# Patient Record
Sex: FEMALE | Race: WHITE
[De-identification: names, ages, dates, MRNs, and addresses within clinical notes are randomized per-mention and may not be internally consistent; named-entity substitution may affect disease eponyms.]

---

## 2017-12-15 NOTE — PCVCIMAG
--------------- APPROVED REPORT --------------





Study performed:  12/15/2017 12:40:20



EXAM: Comprehensive 2D, Doppler, and color-flow 

Echocardiogram

Patient Location: Echo lab

Room #:  2Status:  routine



BSA:         1.78

HR: 67 bpmBP:          106/72 mmHg

Rhythm: NSR



Other Information 

Study Quality: Adequate



Risk Factors: 

Cardiac Risk Factors:  HTN, Hyperlipidemia



Indications

Atrial Fibrillation

CAD

Hypertension/HDD

Coronary Ca+ score &gt;1000,  paroxysmal a fib



2D Dimensions

LVEF(%):  65.79 (&gt;50%)

IVSd:  9.64 (7-11mm)LVOT Diam:  18.54 (18-24mm) 

LVDd:  45.71 mm

PWd:  8.81 (7-11mm)Ascending Ao:  31.33 (22-36mm)

LVDs:  29.22 (25-40mm)

Left Atrium:  33.53 (27-40mm)

Aortic Root:  24.20 mm

LV Single Plane 4CH:  48.60 %

LV Single Plane 2CH:  53.32 %Morgan's LVEF:  50.96 %

Biplane EF:  51.3 %



Volumes

Left Atrial Volume (Systole)

Single Plane 4CH:  63.56 mLSingle Plane 2CH:  46.62 mL

Biplane LA Volume:  57.00 mLLA ESV Index:  32.00 mL/m2



Aortic Valve

AoV Peak Alexi.:  1.36 m/s

AO Peak Gr.:  7.45 mmHgLVOT Max P.51 mmHg

LVOT Max V:  0.61 m/s

SHELBIE Vmax: 1.21 cm2

AI Vmax:  2.53 m/s

AI [place]:  1.22 m/s2

AI PHT:  599.88 ms



Mitral Valve

E/A Ratio:  0.5

MV Decel. Time:  312.71 ms

MV E Max Alexi.:  0.30 m/s

MV A Alexi.:  0.61 m/s

IVRT:  141.87 ms



TDI

E/Lateral E':  10.00E/Medial E':  7.50

Medial E' Alexi.:  0.04 m/s

Lateral E' Alexi.:  0.03 m/s



Pulmonary Valve

PV Peak Alexi.:  0.77 m/sPV Peak Gr.:  2.36 mmHg



Pulmonary Vein

P Vein S:    0.42 m/sP Vein A:  0.29 m/s

P Vein D:   0.22 m/sP Vein A Dur.:  96.9 msec

P Vein S/D Ratio:  1.91



Tricuspid Valve

TR Peak Alexi.:  2.67 m/s

TR Peak Gr.:  28.50 mmHg

TV Vmax:  0.46 m/sPA Pressure:  36.00 mmHg



Left Ventricle

The left ventricle is normal size. There is normal LV segmental wall 

motion. There is normal left ventricular wall thickness. Left 

ventricular systolic function is low normal. LVEF is 50-55%. Grade I 

- abnormal relaxation pattern.



Right Ventricle

The right ventricle is normal size. The right ventricular systolic 

function is normal.



Atria

The left atrium size is normal. The right atrium size is 

normal.



Aortic Valve

Aortic valve is trileaflet. The aortic valve leaflets are mildly 

sclerotic. Trace to mild aortic regurgitation. There is no aortic 

valvular stenosis.



Mitral Valve

The mitral valve is normal in structure. Trace mitral regurgitation. 

No evidence of mitral valve stenosis.



Tricuspid Valve

The tricuspid valve is normal in structure. Trace to mild tricuspid 

regurgitation with a PA pressure of 36 mmHg.



Pulmonic Valve

The pulmonary valve is normal in structure. Trace pulmonic 

regurgitation.



Great Vessels

The aortic root is normal in size. The ascending aorta is normal in 

size. IVC is normal in size and collapses with &gt;50% 

inspiration



Pericardium

There is no pericardial effusion. There is no pleural 

effusion.



&lt;Conclusion&gt;

The left ventricle is normal size.

Left ventricular systolic function is low normal.

LVEF is 50-55%.

Grade I - abnormal relaxation pattern.

The right ventricle is normal size.

The left atrium size is normal.

The right atrium size is normal.

Aortic valve is trileaflet.

The aortic valve leaflets are mildly sclerotic.

Trace to mild aortic regurgitation.

There is no aortic valvular stenosis.

Trace mitral regurgitation.

Trace to mild tricuspid regurgitation with a PA pressure of 36 

mmHg.

There is no pericardial effusion.

## 2019-08-16 NOTE — PCVCIMAG
--------------- APPROVED REPORT --------------





Imaging Protocol: Rest Tc-99m/Stress Tc-99m 1 day

Study performed:  08/15/2019 11:03:53



Indication: Chest Pain, AFib, Ca+ Score

Patient Location: Out-Patient

Stress Nurse: Maura Callejas RN, Dee Mota RN

NM Tech:Gwen CHUCKY Hutchinson



Ht: 5 ft 6 in Wt: 163 lbs BSA:  1.83 m2

HR: 60 bpm                      BP: 200/98 mmHg         BMI:  

26.3

Rhythm:  Sinus Rhythm, nonspecific T abnormalities



Medical History

Medical History: Age, HTN, AFib, High Ca+ Score, Family hx CAD, 

Former Tobacco User

Medications: Eliquis, Metoprolol (took this am) 

Olmesartan-HCTZ

Allergies: No known drug allergies

Pretest Chest Pain Characteristics: No chest pain

Exercise History: Sedentary

Physical Disabilities: Back



Resting Data

Rest SPECT myocardial perfusion imaging was performed in supine 

position 45 minutes following the intravenous injection of 10.9 mCi 

of Tc-99m Sestamibi.

Time of rest injection: 1015     Date: 08/15/2019

Administration Route: IV

Administration Site: Right AC



Pharmacologic Stress

Pharmacologic stress test was performed by injecting Regadenoson 0.4 

mg IV push over 10-15 seconds immediately followed by the intravenous 

injection of 31.9 mCi of Tc-99m Sestamibi.

Time of stress injection: 1140     Date: 08/15/2019

Gated Stress SPECT was performed 45 minutes after stress 

injection.

The images were gated to evaluate regional wall motion and calculate 

left ventricular ejection fraction. 



Stress Test Details

Stress Test:  Pharmacologic stress testing performed using 0.4 mg of 

regadenoson per 5 mL given IV over 10 seconds.

  Reason for pharmacologic stress test: Chronic Back Pain.



HRMax Heart Rate (APMHR): 137 bpm 

Resting HR:            60 bpmTarget HR (85% APMHR): 116 bpm

Max HR Achieved:  86 bpm

% of APMHR:         62

Recovery HR:            80 bpm



BP

Resting BP:  200/98 mmHg

Max BP:       210/104 mmHg

Recovery BP:       175/86 mmHg

ECG

Resting ECG:  Sinus Rhythm, nonspecific T abnormalities

Stress ECG:     Sinus Rhythm, nonspecific T 

abnormalities

Arrhythmia:    PVCs

Recovery ECG: Sinus Rhythm, nonspecific T 

abnormalities



Clinical

Reason for Termination: Completed protocol

Stress Symptoms: Dyspnea, Lightheaded

Exercise duration:  min 55 sec

Symptoms resolved with caffeine.Symptoms resolved with 

caffeine.



Stress ECG Conclusion

ECG: Non-ischemic



Study Quality

Study: Good



Study Data

Post stress, the left ventricular ejection was 74%..

SSS: 0

SRS: 1

SDS: 0

TID = 0.86.



Perfusion

No evidence of stress induced ischemia or prior myocardial 

infarction.



Wall Motion

Normal left ventricular size and function with no regional wall 

motion abnormalities.



Nuclear Conclusion

No evidence of stress induced ischemia or prior myocardial 

infarction.

Normal left ventricular size and function with no regional wall 

motion abnormalities.

Post stress, the left ventricular ejection was 74%. 

No change since prior study dated January 2017.



Interpreted by:  Rafael Carrero MD

Electronically Approved: 08/15/2019 

15:55:51



<Conclusion>

ECG: Non-ischemic

## 2019-10-23 ENCOUNTER — HOSPITAL ENCOUNTER (OUTPATIENT)
Dept: HOSPITAL 61 - PCVCIMAG | Age: 84
Discharge: HOME | End: 2019-10-23
Attending: INTERNAL MEDICINE
Payer: COMMERCIAL

## 2019-10-23 DIAGNOSIS — M19.90: ICD-10-CM

## 2019-10-23 DIAGNOSIS — Z87.891: ICD-10-CM

## 2019-10-23 DIAGNOSIS — Z79.899: ICD-10-CM

## 2019-10-23 DIAGNOSIS — I08.3: Primary | ICD-10-CM

## 2019-10-23 DIAGNOSIS — I11.9: ICD-10-CM

## 2019-10-23 DIAGNOSIS — E78.00: ICD-10-CM

## 2019-10-23 DIAGNOSIS — I48.0: ICD-10-CM

## 2019-10-23 PROCEDURE — 36415 COLL VENOUS BLD VENIPUNCTURE: CPT

## 2019-10-23 PROCEDURE — 93005 ELECTROCARDIOGRAM TRACING: CPT

## 2019-10-23 PROCEDURE — G0463 HOSPITAL OUTPT CLINIC VISIT: HCPCS

## 2019-10-23 PROCEDURE — 80061 LIPID PANEL: CPT

## 2019-10-23 PROCEDURE — 76700 US EXAM ABDOM COMPLETE: CPT

## 2019-10-23 PROCEDURE — 93306 TTE W/DOPPLER COMPLETE: CPT

## 2019-10-23 NOTE — PCVCIMAG
--------------- APPROVED REPORT --------------





Study performed:  10/23/2019 08:46:22



EXAM: Comprehensive 2D, Doppler, and color-flow 

Echocardiogram

Patient Location: Echo lab

Status:  routine



BSA:         1.83

HR: 70 bpmBP:          126/74 mmHg

Rhythm: NSR



Other Information 

Study Quality: Adequate



Risk Factors: 

Cardiac Risk Factors:  HTN



Indications

Chest Pain

parox a fib



2D Dimensions

IVSd:  11.76 (7-11mm)

LVDd:  42.39 mm

PWd:  10.79 (7-11mm)Ascending Ao:  38.02 (22-36mm)

LVDs:  28.62 (25-40mm)

Left Atrium:  39.17 (27-40mm)

Aortic Root:  39.30 mm

LV Single Plane 4CH:  49.71 %

LV Single Plane 2CH:  62.90 %

Biplane EF:  55.5 %



Volumes

Left Atrial Volume (Systole)

Single Plane 4CH:  57.31 mLSingle Plane 2CH:  66.34 mL

LA ESV Index:  35.00 mL/m2



Aortic Valve

AoV Peak Alexi.:  1.93 m/s

AO Peak Gr.:  14.92 mmHgLVOT Max PG:  3.82 mmHg

LVOT Max V:  0.98 m/s



Mitral Valve

E/A Ratio:  0.7

MV Decel. Time:  242.52 ms

MV E Max Alexi.:  0.40 m/s

MV A Alexi.:  0.55 m/s

IVRT:  152.25 ms



Pulmonary Valve

PV Peak Alexi.:  1.34 m/sPV Peak Gr.:  7.29 mmHg



Pulmonary Vein

P Vein S:    0.30 m/sP Vein A:  0.30 m/s

P Vein D:   0.44 m/sP Vein A Dur.:  134.9 msec

P Vein S/D Ratio:  0.68



Tricuspid Valve

TR Peak Alexi.:  2.82 m/s

TR Peak Gr.:  30.63 mmHg



Left Ventricle

The left ventricle is normal size. There is normal LV segmental wall 

motion. Borderline concentric left ventricular hypertrophy. Left 

ventricular systolic function is normal. The left ventricular 

ejection fraction is within the normal range. LVEF is 50-55%. Grade I 

- abnormal relaxation pattern.



Right Ventricle

The right ventricle is normal size. The right ventricular systolic 

function is normal.



Atria

Left atrium is mildly dilated. The right atrium size is 

normal.



Aortic Valve

Mild aortic valve sclerosis. Mild aortic regurgitation. There is no 

aortic valvular stenosis.



Mitral Valve

The mitral valve is normal in structure. Mild mitral regurgitation. 

No evidence of mitral valve stenosis.



Tricuspid Valve

The tricuspid valve is normal in structure. Mild tricuspid 

regurgitation with PAP of 38 mmHg.



Pulmonic Valve

The pulmonary valve is normal in structure. Mild pulmonic 

regurgitation.



Great Vessels

Aortic root is mildly dilated to 3.9 cm. IVC is normal in size and 

collapses >50% with inspiration.



Pericardium

There is no pericardial effusion. There is no pleural 

effusion.



<Conclusion>

The left ventricle is normal size.

Borderline concentric left ventricular hypertrophy.

LVEF is 50-55%.

Grade I - abnormal relaxation pattern.

The right ventricle is normal size.

Left atrium is mildly dilated.

Mild aortic valve sclerosis.

Mild aortic regurgitation.

Mild mitral regurgitation.

Mild tricuspid regurgitation with PAP of 38 mmHg.

Aortic root is mildly dilated to 3.9 cm.

There is no pericardial effusion.

## 2019-10-23 NOTE — PCVCIMAG
EXAM: ABDOMINAL ULTRASOUND COMPLETE



INDICATION: Abdominal pain



FINDINGS: 

Gallbladder: No shadowing gallstones.  Note is made of a small amount

of sludge in the gallbladder.  No wall thickening or abnormal

pericholecystic fluid.

Liver: Normal in size measuring 16.4 cm in length. No focal masses.

Bile ducts: No intra or extra hepatic bile duct dilatation. The common

bile duct measures 6.5 mm.

Pancreas: Unremarkable where seen.

Spleen: Normal in size measuring 7.3 cm in greatest dimension. No

focal masses.

Right kidney: No hydronephrosis. Length measures 14.0 cm. 7.1 cm

benign cyst upper pole.

Left kidney: No hydronephrosis. Length measures 11.5 cm.

Inferior vena cava: Normal in size where seen.

Aorta: Normal in caliber where seen.



IMPRESSION: 

Small amount of gallbladder sludge.  No shadowing gallstones

identified.  Gallbladder otherwise unremarkable.



LOC:[id number]

## 2019-11-06 ENCOUNTER — HOSPITAL ENCOUNTER (OUTPATIENT)
Dept: HOSPITAL 35 - BC | Age: 84
End: 2019-11-06
Attending: FAMILY MEDICINE
Payer: COMMERCIAL

## 2019-11-06 DIAGNOSIS — N64.4: Primary | ICD-10-CM

## 2019-11-06 DIAGNOSIS — R92.2: ICD-10-CM

## 2019-11-08 ENCOUNTER — HOSPITAL ENCOUNTER (OUTPATIENT)
Dept: HOSPITAL 35 - GI | Age: 84
Discharge: HOME | End: 2019-11-08
Attending: INTERNAL MEDICINE
Payer: COMMERCIAL

## 2019-11-08 DIAGNOSIS — Z96.1: ICD-10-CM

## 2019-11-08 DIAGNOSIS — Z90.49: ICD-10-CM

## 2019-11-08 DIAGNOSIS — Z79.01: ICD-10-CM

## 2019-11-08 DIAGNOSIS — I48.20: ICD-10-CM

## 2019-11-08 DIAGNOSIS — E78.5: ICD-10-CM

## 2019-11-08 DIAGNOSIS — Z88.8: ICD-10-CM

## 2019-11-08 DIAGNOSIS — Z98.42: ICD-10-CM

## 2019-11-08 DIAGNOSIS — Z98.890: ICD-10-CM

## 2019-11-08 DIAGNOSIS — K31.89: ICD-10-CM

## 2019-11-08 DIAGNOSIS — K31.4: ICD-10-CM

## 2019-11-08 DIAGNOSIS — I10: ICD-10-CM

## 2019-11-08 DIAGNOSIS — R10.11: Primary | ICD-10-CM

## 2019-11-08 DIAGNOSIS — Z98.41: ICD-10-CM

## 2019-11-08 DIAGNOSIS — K29.60: ICD-10-CM

## 2019-11-08 DIAGNOSIS — I85.00: ICD-10-CM

## 2019-11-08 DIAGNOSIS — M19.90: ICD-10-CM

## 2019-11-08 DIAGNOSIS — Z79.899: ICD-10-CM

## 2019-11-08 DIAGNOSIS — Z96.652: ICD-10-CM

## 2019-11-08 PROCEDURE — 62900: CPT

## 2019-11-08 PROCEDURE — 62110: CPT

## 2019-11-08 NOTE — HC
[facility]
[address]
[place]   [address]                     CONSULTATION                  
_______________________________________________________________________________
 
Name:       STAR ZARAGOZA               [facility] #:                     [facility]      [id number]                       Account #:      [id number]  
Admission:  11/08/19    Attend Phys:    Mai Rai DO 
Discharge:              Date of Birth:  [birth year]  
                                                          Report #: [id number]
                                                                    [id number]   
_______________________________________________________________________________
THIS REPORT FOR:   //name//                          
 
CC: Mai Schneider MD
 
DATE OF SERVICE:  11/08/2019
 
 
GASTROENTEROLOGY CONSULTATION 
 
PATIENT OF:  Dr. Jesus Schneider.
 
INDICATION FOR CONSULTATION:  This patient is a very pleasant 84-year-old white
female with a chief complaint of 1-month history of nonradiating, burning right
upper quadrant, right lower chest pain.  It is unrelated to food intake,
movement or breathing.  It does awaken her from sleep at night.  It began about
a month ago.  She says that she has had a CT scan that shows that she has tiny
gallstones, but nothing else was abnormal on the CAT scan and no explanation for
her pain was found.
 
REVIEW OF SYSTEMS:  She denies any nausea or vomiting, change in appetite,
change in weight, darkening of the urine or lightning of her stools.  She denies
any hematemesis, hematochezia or melena.  She denies dysphagia, odynophagia,
gastroesophageal reflux, history of a hiatal hernia or peptic ulcer disease. 
She does have occasional constipation.  She denies diarrhea.
 
PAST MEDICAL HISTORY:  Significant for hypertension, macular degeneration,
chronic atrial fibrillation for which she takes Eliquis, hyperlipidemia,
arthritis.
 
PAST SURGICAL HISTORY:  Significant for bilateral cataract extractions and lens
implants.  She had a left knee replacement.  She has had vein stripping.  She
has had bunion surgery, tonsillectomy and adenoidectomy and an appendectomy.
 
ALLERGIES:  The patient states she was allergic to some type of numbing
medication that was given by her dentist during a dental procedure.  She does
not know what it was called, but I think it may have been some type of NOVOCAIN
product that she received.  Other than that, she has no known drug allergies.
 
MEDICATIONS:  Prior to this consultation included Eliquis, olmesartan,
amlodipine, metoprolol, PreserVision and tramadol.
 
SOCIAL HISTORY:  She does not smoke.  She drinks 8-12 ounces of bourbon on the
rocks per day.  Her family relate this to us.  She does not use any IV drugs. 
She does not smoke marijuana.
 
 
 
[address]   [address]                     CONSULTATION                  
_______________________________________________________________________________
 
Name:       STAR ZARAGOZA               Room #:                     REG CLI 
SIXTO#:      [id number]                       Account #:      [id number]  
Admission:  11/08/19    Attend Phys:    Mai Rai DO 
Discharge:              Date of Birth:  [birth year]  
                                                          Report #: [id number]
                                                                    [id number]   
_______________________________________________________________________________
 
FAMILY HISTORY:  Negative for colon polyps, colon cancer, Crohn's disease and
ulcerative colitis.
 
REVIEW OF SYSTEMS:  Please see the chief complaint above.
 
PHYSICAL EXAMINATION:
GENERAL:  Reveals a well-developed, well-nourished 84-year-old white female, in
no apparent distress at the time of the examination.  She is awake, alert,
oriented x 4 and cooperative and very pleasant to converse with.
HEENT:  She is normocephalic, atraumatic and anicteric.
HEART:  Irregularly irregular with a normal S1 and S2.  She looks like she is
having a lot of PVCs on her rhythm strip.
LUNGS:  Clear to auscultation bilaterally.
ABDOMEN:  Soft.  Bowel sounds are present in all 4 quadrants.  There is no
palpable organomegaly or mass.  The area that is involving the pain seems to be
up in her chest more.  There is no tenderness, rebound or guarding at this time
in the abdomen.
EXTREMITIES:  Warm and dry.
NEUROLOGIC:  She appears grossly intact without lateralizing signs.
 
IMPRESSION:
1.  Nonradiating burning right upper quadrant/right lower chest pain,
undetermined etiology, not related to food intake, movement, or breathing.  It
does awaken her at night, however, and it began about one month ago.
2.  History of hypertension.
3.  History of macular degeneration.
4.  Atrial fibrillation, on Eliquis.
5.  Hyperlipidemia.
6.  Arthritis.
 
RECOMMENDATIONS:  My recommendations were to proceed with EGD at this time.  The
risks of bleeding, perforation, infection, complications of sedation and the
possibility I could miss something were explained to the patient.  She has
indicated her consent by signing.
 
Thank you very much once again for allowing me to participate in her care, Dr. Schneider.
 
 
 
 
 
 
  <ELECTRONICALLY SIGNED>
   By: Mai Rai DO         
  11/08/19     2147
D: 11/08/19 1214                           _____________________________________
T: 11/08/19 1248                           Mai Rai DO           /nt

## 2019-11-08 NOTE — P
[facility]
[address]
[place]   [address]                     PROCEDURE REPORT              
_______________________________________________________________________________
 
Name:       STAR ZARAGOZA               [facility] #[facility]                     [facility]      [id number]                       Account #:      [id number]  
Admission:  11/08/19    Attend Phys:    Mai Rai DO 
Discharge:              Date of Birth:  [birth year]  
                                                          Report #: [id number]
                                                                    [id number]   
_______________________________________________________________________________
THIS REPORT FOR:   //name//                          
 
CC: Mai Schneider MD
 
DATE OF SERVICE:  11/08/2019
 
 
PROCEDURE:  Diagnostic EGD. 
 
INDICATION FOR PROCEDURE:  The patient has nonradiating, burning right upper
quadrant, right lower chest pain, undetermined etiology, unrelated to food
intake, movement or breathing.  It does, however, awaken her at night.  It has
been present for about one month.  She has a CT scan that did not reveal the
etiology of this pain, but did show some tiny gallstones.  She denies any
darkening of the urine or lightening of the stool.  She has had no change in
appetite or change in weight.  She denies any dysphagia, odynophagia,
gastroesophageal reflux, hiatal hernia, peptic ulcer disease or diarrhea.  She
has had occasional constipation.  Denies any hematemesis, hematochezia or
melena.
 
Informed consent for this procedure was obtained prior to the administration of
any medication.  The risks which include but are not limited to the following: 
bleeding, perforation, infection, complications of sedation and the possibility
I could miss something have been explained to the patient and she has indicated
her consent by signing.
 
Anesthesia kindly provided deep sedation for this  procedure.
 
With the patient in the left lateral decubitus position, the Olympus upper
videoscope was introduced through the upper esophageal sphincter and advanced
under direct visualization to the third portion of the duodenum.  Findings are
noted on withdrawal of the scope.  The second portion of the duodenum appears
normal throughout its entirety.  There is bile in the second portion of the
duodenum.  The duodenal bulb was mildly erythematous.  Pylorus is edematous and
mildly erythematous.  There is a prepyloric or antral diverticulum adjacent to
the pylorus.  The antrum itself is erythematous.  Body, mildly erythematous
mucosa in places.  She does have some scattered erosive gastritis.  Cardia and
fundus, the patient does have some significant erosive gastritis in the fundus
of the stomach.  The cardia appears normal.  No biopsies are taken as the
patient has been on Eliquis.  The scope was withdrawn into the esophagus.  The
Z-line is appropriately located at the top of the gastric folds and looks
normal.  There was one slightly dilated grade 1 esophageal varix that collapses
with air insufflation extending from the Z line up to the mid esophagus.  It is
nonbleeding and has no stigmata of recent bleed.  The more proximal esophageal
 
 
 
[facility]
[address] [place]   [address]                     PROCEDURE REPORT              
_______________________________________________________________________________
 
Name:       STAR ZARAGOZA               Room #:                     REG [facility]#:      [id number]                       Account #:      [id number]  
Admission:  11/08/19    Attend Phys:    Mai Rai DO 
Discharge:              Date of Birth:  [birth year]  
                                                          Report #: [id number]
                                                                    [id number]   
_______________________________________________________________________________
mucosa appears normal.  The scope was withdrawn.  The patient went to the
recovery area in stable condition.  She tolerated the procedure well.
 
IMPRESSION:
1.  Mild duodenal bulb erythema.
2.  Edematous pylorus with erythema.
3.  Antral erythema.
4.  Prepyloric/antral diverticulum, benign looking.
5.  Patchy erythematous erosive gastritis involving sections of the body of the
stomach and of the fundus.  There is no evidence of any gastric varices.
6.  Single grade 1 esophageal varix without stigmata of recent bleed.  Other
than that, normal esophageal mucosa.
 
RECOMMENDATIONS:  My recommendations are as follows:  I would recommend that she
begin taking a proton pump inhibitor such as Protonix 40 mg p.o. b.i.d.  It is
possible that some of this pain may be related to her gastritis and I would
recommend that approach for now.
 
Thank you very much once again for allowing me to participate in her care Dr. Schneider.
 
 
 
 
 
 
 
 
 
 
 
 
 
 
 
 
 
 
 
 
 
 
 
 
  <ELECTRONICALLY SIGNED>
   By: Mai Rai DO         
  11/08/19     2147
D: 11/08/19 1219                           _____________________________________
T: 11/08/19 1245                           Mai Rai DO           /nt